# Patient Record
Sex: MALE | Race: WHITE
[De-identification: names, ages, dates, MRNs, and addresses within clinical notes are randomized per-mention and may not be internally consistent; named-entity substitution may affect disease eponyms.]

---

## 2021-05-18 ENCOUNTER — HOSPITAL ENCOUNTER (OUTPATIENT)
Dept: HOSPITAL 47 - RADUSWWP | Age: 46
Discharge: HOME | End: 2021-05-18
Attending: FAMILY MEDICINE
Payer: COMMERCIAL

## 2021-05-18 DIAGNOSIS — K76.0: Primary | ICD-10-CM

## 2021-05-18 PROCEDURE — 76705 ECHO EXAM OF ABDOMEN: CPT

## 2021-05-18 NOTE — US
EXAMINATION TYPE: US liver 

 

DATE OF EXAM: 5/18/2021

 

COMPARISON: NONE

 

CLINICAL HISTORY: R74.01 elevated liver enzymes. Elevated liver enzymes

 

EXAM MEASUREMENTS:

 

Liver Length:  18.2 cm   

Gallbladder Wall:  .2 cm   

CBD:  .4 cm

Right Kidney:  10.2 x 4.8 x 5.0  cm

 

 

 

Pancreas:  Obscured by bowel gas

Liver:  Increased attenuation  suggestive of diffuse fatty infiltration. No discrete hepatic mass or 
intrahepatic biliary dilatation.

Gallbladder:  No stones seen

**Evidence for sonographic Adame's sign:  No

CBD:  wnl 

Right Kidney:  No hydronephrosis or masses seen 

 

 

 

IMPRESSION: 

1. Diffuse hepatic steatosis.

2. The pancreas is obscured/not visualized by overlying bowel gas.